# Patient Record
Sex: MALE | Race: WHITE | NOT HISPANIC OR LATINO | URBAN - METROPOLITAN AREA
[De-identification: names, ages, dates, MRNs, and addresses within clinical notes are randomized per-mention and may not be internally consistent; named-entity substitution may affect disease eponyms.]

---

## 2018-08-14 ENCOUNTER — OFFICE (OUTPATIENT)
Dept: URBAN - METROPOLITAN AREA CLINIC 11 | Facility: CLINIC | Age: 23
End: 2018-08-14

## 2018-08-14 VITALS
HEIGHT: 72 IN | SYSTOLIC BLOOD PRESSURE: 124 MMHG | WEIGHT: 205 LBS | DIASTOLIC BLOOD PRESSURE: 49 MMHG | HEART RATE: 87 BPM

## 2018-08-14 DIAGNOSIS — R13.19 OTHER DYSPHAGIA: ICD-10-CM

## 2018-08-14 DIAGNOSIS — K21.9 GASTRO-ESOPHAGEAL REFLUX DISEASE WITHOUT ESOPHAGITIS: ICD-10-CM

## 2018-08-14 PROCEDURE — 99204 OFFICE O/P NEW MOD 45 MIN: CPT | Performed by: INTERNAL MEDICINE

## 2018-08-14 RX ORDER — OMEPRAZOLE MAGNESIUM 20.6 MG/1
20 TABLET, DELAYED RELEASE ORAL
Refills: 0 | Status: COMPLETED
End: 2018-08-14

## 2018-08-14 RX ORDER — PANTOPRAZOLE SODIUM 40 MG/1
40 TABLET, DELAYED RELEASE ORAL
Qty: 30 | Refills: 0 | Status: ACTIVE
Start: 2018-08-14

## 2018-08-14 NOTE — SERVICENOTES
We discussed the history of GERD, symptoms of dysphagia, chronic GERD, and chronic cough.  We discussed the potential relationship of cough to GERD and typical improvement over time with treatment.  We also discussed his issues with dysphagia, a dif dx including rings, strictures, motility issues, ulcerations/esophagitis, Barretts.  We discussed the need for evaluation by EGD and possible dilation including risks/benefits/expectations and he agreed to proceed.  We also discussed the trial of Protonix, potential side effects of the PPIs, and a GERD diet. We discussed that his chest wall pain seems to be muscular and related to his cough.

## 2018-08-14 NOTE — SERVICEHPINOTES
He stated that about 4 years ago or so he was diagnosed with reflux disease.  He was treated with a "round of Prilosec".  This helped his symptoms.  He has not been treated consistently since then.  He has noted that over the past year to year and a half, he has had an increase in his bleching, epigastric pain and mid chest pain which has related to eating and coughing.  He stated that the cough can be persistent and occurs daily.  He has to force a belch to help with the cough.  The cough has been dry.   He has thought that there may be an issue with solid food sticking i the loer chest/upper abdomen.  He has tried OTC Prilosec starting about a week ago.  He has still had the cough and thought that the pain "may be subtly better".   The epigastric pain sometimes radiates to his back with coughing.  He stated that it "impacts the quality of life about a thrid of the time".  He has tried to vary his diet. He has had some dysphagia to solid foods.His mother has had colon cancer at age 53.